# Patient Record
Sex: FEMALE | Race: BLACK OR AFRICAN AMERICAN | NOT HISPANIC OR LATINO | Employment: FULL TIME | URBAN - METROPOLITAN AREA
[De-identification: names, ages, dates, MRNs, and addresses within clinical notes are randomized per-mention and may not be internally consistent; named-entity substitution may affect disease eponyms.]

---
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
## 2018-07-25 ENCOUNTER — TELEPHONE (OUTPATIENT)
Dept: OBSTETRICS AND GYNECOLOGY | Facility: CLINIC | Age: 18
End: 2018-07-25

## 2018-07-25 NOTE — TELEPHONE ENCOUNTER
LAW    Can you work her in next week?    Sourav          Pt's mother called (on HIPPA form) to schedule a patient for abnormal bleeding. She stated that she would like for her to get in sooner rather than later. Can she be worked in?       Call back # [phone]

## 2018-10-22 ENCOUNTER — TRANSCRIBE ORDERS (OUTPATIENT)
Dept: LAB | Facility: HOSPITAL | Age: 18
End: 2018-10-22

## 2018-10-22 ENCOUNTER — LAB (OUTPATIENT)
Dept: LAB | Facility: HOSPITAL | Age: 18
End: 2018-10-22

## 2018-10-22 DIAGNOSIS — Z32.01 PREGNANCY EXAMINATION OR TEST, POSITIVE RESULT: Primary | ICD-10-CM

## 2018-10-22 DIAGNOSIS — Z32.01 PREGNANCY EXAMINATION OR TEST, POSITIVE RESULT: ICD-10-CM

## 2018-10-22 LAB
HCG INTACT+B SERPL-ACNC: 410 MIU/ML
PROGEST SERPL-MCNC: 26.89 NG/ML

## 2018-10-22 PROCEDURE — 84144 ASSAY OF PROGESTERONE: CPT

## 2018-10-22 PROCEDURE — 84702 CHORIONIC GONADOTROPIN TEST: CPT

## 2018-10-22 PROCEDURE — 36415 COLL VENOUS BLD VENIPUNCTURE: CPT
